# Patient Record
Sex: FEMALE | Race: WHITE | Employment: FULL TIME | URBAN - METROPOLITAN AREA
[De-identification: names, ages, dates, MRNs, and addresses within clinical notes are randomized per-mention and may not be internally consistent; named-entity substitution may affect disease eponyms.]

---

## 2017-12-12 PROBLEM — Z96.1 PSEUDOPHAKIA OF BOTH EYES: Status: ACTIVE | Noted: 2017-12-12

## 2017-12-12 PROBLEM — H35.89 RPE MOTTLING OF MACULA: Status: ACTIVE | Noted: 2017-12-12

## 2021-09-21 ENCOUNTER — HOSPITAL ENCOUNTER (INPATIENT)
Facility: HOSPITAL | Age: 76
LOS: 5 days | Discharge: HOME | DRG: 390 | End: 2021-09-26
Attending: EMERGENCY MEDICINE | Admitting: INTERNAL MEDICINE
Payer: COMMERCIAL

## 2021-09-21 ENCOUNTER — APPOINTMENT (OUTPATIENT)
Dept: GENERAL RADIOLOGY | Facility: HOSPITAL | Age: 76
DRG: 390 | End: 2021-09-21
Attending: EMERGENCY MEDICINE
Payer: COMMERCIAL

## 2021-09-21 DIAGNOSIS — K56.609 SMALL BOWEL OBSTRUCTION (HCC): Primary | ICD-10-CM

## 2021-09-21 PROBLEM — R73.9 HYPERGLYCEMIA: Status: ACTIVE | Noted: 2021-09-21

## 2021-09-21 PROBLEM — R79.89 AZOTEMIA: Status: ACTIVE | Noted: 2021-09-21

## 2021-09-21 PROCEDURE — S0028 INJECTION, FAMOTIDINE, 20 MG: HCPCS | Performed by: INTERNAL MEDICINE

## 2021-09-21 PROCEDURE — 71045 X-RAY EXAM CHEST 1 VIEW: CPT | Performed by: EMERGENCY MEDICINE

## 2021-09-21 PROCEDURE — 96365 THER/PROPH/DIAG IV INF INIT: CPT

## 2021-09-21 PROCEDURE — 99285 EMERGENCY DEPT VISIT HI MDM: CPT

## 2021-09-21 PROCEDURE — 96366 THER/PROPH/DIAG IV INF ADDON: CPT

## 2021-09-21 PROCEDURE — 0D9670Z DRAINAGE OF STOMACH WITH DRAINAGE DEVICE, VIA NATURAL OR ARTIFICIAL OPENING: ICD-10-PCS | Performed by: EMERGENCY MEDICINE

## 2021-09-21 PROCEDURE — 96375 TX/PRO/DX INJ NEW DRUG ADDON: CPT

## 2021-09-21 PROCEDURE — 96361 HYDRATE IV INFUSION ADD-ON: CPT

## 2021-09-21 RX ORDER — MORPHINE SULFATE 2 MG/ML
2 INJECTION, SOLUTION INTRAMUSCULAR; INTRAVENOUS EVERY 2 HOUR PRN
Status: DISCONTINUED | OUTPATIENT
Start: 2021-09-21 | End: 2021-09-26

## 2021-09-21 RX ORDER — METOCLOPRAMIDE HYDROCHLORIDE 5 MG/ML
5 INJECTION INTRAMUSCULAR; INTRAVENOUS EVERY 8 HOURS PRN
Status: DISCONTINUED | OUTPATIENT
Start: 2021-09-21 | End: 2021-09-26

## 2021-09-21 RX ORDER — MORPHINE SULFATE 2 MG/ML
1 INJECTION, SOLUTION INTRAMUSCULAR; INTRAVENOUS EVERY 2 HOUR PRN
Status: DISCONTINUED | OUTPATIENT
Start: 2021-09-21 | End: 2021-09-26

## 2021-09-21 RX ORDER — MORPHINE SULFATE 4 MG/ML
4 INJECTION, SOLUTION INTRAMUSCULAR; INTRAVENOUS EVERY 30 MIN PRN
Status: DISCONTINUED | OUTPATIENT
Start: 2021-09-21 | End: 2021-09-21

## 2021-09-21 RX ORDER — DEXTROSE, SODIUM CHLORIDE, AND POTASSIUM CHLORIDE 5; .45; .15 G/100ML; G/100ML; G/100ML
INJECTION INTRAVENOUS CONTINUOUS
Status: DISCONTINUED | OUTPATIENT
Start: 2021-09-21 | End: 2021-09-26

## 2021-09-21 RX ORDER — ONDANSETRON 2 MG/ML
4 INJECTION INTRAMUSCULAR; INTRAVENOUS EVERY 6 HOURS PRN
Status: DISCONTINUED | OUTPATIENT
Start: 2021-09-21 | End: 2021-09-26

## 2021-09-21 RX ORDER — ONDANSETRON 2 MG/ML
4 INJECTION INTRAMUSCULAR; INTRAVENOUS EVERY 4 HOURS PRN
Status: DISCONTINUED | OUTPATIENT
Start: 2021-09-21 | End: 2021-09-21

## 2021-09-21 RX ORDER — FAMOTIDINE 10 MG/ML
20 INJECTION, SOLUTION INTRAVENOUS NIGHTLY
Status: DISCONTINUED | OUTPATIENT
Start: 2021-09-21 | End: 2021-09-26

## 2021-09-21 RX ORDER — MORPHINE SULFATE 4 MG/ML
4 INJECTION, SOLUTION INTRAMUSCULAR; INTRAVENOUS EVERY 2 HOUR PRN
Status: DISCONTINUED | OUTPATIENT
Start: 2021-09-21 | End: 2021-09-26

## 2021-09-21 RX ORDER — MORPHINE SULFATE 4 MG/ML
4 INJECTION, SOLUTION INTRAMUSCULAR; INTRAVENOUS ONCE
Status: COMPLETED | OUTPATIENT
Start: 2021-09-21 | End: 2021-09-21

## 2021-09-21 RX ORDER — NAPROXEN SODIUM 220 MG
1-2 TABLET ORAL AS NEEDED
COMMUNITY
End: 2021-10-11 | Stop reason: ALTCHOICE

## 2021-09-21 RX ORDER — SODIUM CHLORIDE 9 MG/ML
INJECTION, SOLUTION INTRAVENOUS CONTINUOUS
Status: ACTIVE | OUTPATIENT
Start: 2021-09-21 | End: 2021-09-21

## 2021-09-21 NOTE — ED PROVIDER NOTES
Patient Seen in: [facility] Emergency Department      History   Patient presents with:  Abdomen/Flank Pain    Stated Complaint: Abdominal pain sent from IC     Subjective:   HPI    Previously healthy 74-year-old presents for evaluation of right lowe Pulse 92   Resp 18   Temp 98 °F (36.7 °C)   Temp src Temporal   SpO2 99 %   O2 Device None (Room air)       Current:/71   Pulse 80   Temp 98 °F (36.7 °C) (Temporal)   Resp 18   Ht 157.5 cm (5' 2\")   Wt 50.8 kg   SpO2 99%   BMI 20.49 kg/m² Ratio   10.0 - 20.0 34.0 High     Sodium   136 - 145 mmol/L 136    Potassium   3.50 - 5.10 mmol/L 4.08    Chloride   98 - 107 mmol/L 100    Carbon Dioxide   22.0 - 29.0 mmol/L 26.8    Calcium   8.3 - 10.3 mg/dL 10.1    Comment: Corrected Calcium Formula: ( symmetric nephrograms without hydronephrosis. Bilateral renal cysts   are noted measuring up to 2.5 cm. PERITONEUM/MESENTERY/OMENTUM: Gerber McCaulley is a small volume of ascites within the mesentery and pelvis   with partial loculation.      GI TRACT:  Small hi patient  Admission disposition: 9/21/2021  6:01 PM                                  Disposition and Plan     Clinical Impression:  Small bowel obstruction ([address]  (primary encounter diagnosis)     Disposition:  Admit  9/21/2021  6:01 pm    Follow-up:  No fo

## 2021-09-22 ENCOUNTER — APPOINTMENT (OUTPATIENT)
Dept: GENERAL RADIOLOGY | Facility: HOSPITAL | Age: 76
DRG: 390 | End: 2021-09-22
Attending: INTERNAL MEDICINE
Payer: COMMERCIAL

## 2021-09-22 PROCEDURE — S0028 INJECTION, FAMOTIDINE, 20 MG: HCPCS | Performed by: INTERNAL MEDICINE

## 2021-09-22 PROCEDURE — 83735 ASSAY OF MAGNESIUM: CPT | Performed by: INTERNAL MEDICINE

## 2021-09-22 PROCEDURE — 80048 BASIC METABOLIC PNL TOTAL CA: CPT | Performed by: INTERNAL MEDICINE

## 2021-09-22 PROCEDURE — 85610 PROTHROMBIN TIME: CPT | Performed by: INTERNAL MEDICINE

## 2021-09-22 PROCEDURE — 85025 COMPLETE CBC W/AUTO DIFF WBC: CPT | Performed by: INTERNAL MEDICINE

## 2021-09-22 PROCEDURE — 71045 X-RAY EXAM CHEST 1 VIEW: CPT | Performed by: INTERNAL MEDICINE

## 2021-09-22 RX ORDER — POTASSIUM CHLORIDE 14.9 MG/ML
20 INJECTION INTRAVENOUS ONCE
Status: COMPLETED | OUTPATIENT
Start: 2021-09-22 | End: 2021-09-22

## 2021-09-22 NOTE — PLAN OF CARE
Assumed car  Problem: GASTROINTESTINAL - ADULT  Goal: Minimal or absence of nausea and vomiting  Description: INTERVENTIONS:  - Maintain adequate hydration with IV or PO as ordered and tolerated  - Nasogastric tube to low intermittent suction as ordered  -

## 2021-09-22 NOTE — PLAN OF CARE
Problem: GASTROINTESTINAL - ADULT  Goal: Minimal or absence of nausea and vomiting  Description: INTERVENTIONS:  - Maintain adequate hydration with IV or PO as ordered and tolerated  - Nasogastric tube to low intermittent suction as ordered  - Evaluate e LIGHT. PLAN OF CARE EXPLAINED. WILL CONTINUE TO MONITOR.

## 2021-09-22 NOTE — PROGRESS NOTES
[facility] Hospitalist Progress Note     PCP: Larissa Ellison MD    Chief Complaint: follow-up    Overnight/Interim Events:      SUBJECTIVE:  Feeling well, \"great\", laying in bed, better. No n/v. 119/81, 78. Some flatus.      OBJECTIVE:  Temp:  [98 °F (36.7 °C)- last 168 hours. No results for input(s): TROP in the last 168 hours.       Meds:     • famoTIDine  20 mg Intravenous Nightly   • piperacillin-tazobactam  3.375 g Intravenous Q8H     • KCl in Dextrose-NaCl 75 mL/hr at 09/22/21 0541     morphINE sulfate **

## 2021-09-22 NOTE — ED QUICK NOTES
Orders for admission, patient is aware of plan and ready to go upstairs. Any questions, please call ED SHANTI hernandez  at extension [phone]. Vaccinated?  One dose  Type of COVID test sent: rapid  COVID Suspicion level: Low      Titratable drug(s) infusing: n/a

## 2021-09-22 NOTE — H&P
General Medicine H&P     Patient presents with:  Abdomen/Flank Pain       PCP: Sera Zhao MD    History of Present Illness: Patient is a 67year old female with PMH including but not limited to  who p/t [facility] ED c abd pain.      Pt reports having s Lungs:   Clear to auscultation bilaterally.  Normal effort   Chest wall:  No tenderness or deformity   Heart:  Regular rate and rhythm, S1, S2 normal, no murmur, rub or gallop appreciated   Abdomen:   Tight, NT/ND, dec bowel sounds normal. No masses,  No small bowel measuring up to 3.5 cm with transition within the right lower quadrant concerning for high-grade small bowel obstruction. There is mild thickening of the appendix with mural enhancement measuring up to 9 mm.  UROGENITAL STRUCTURES: The uterus is reviewed c  high-grade small bowel obstruction with transition within the right lower quadrant; mild appendiceal thickening with mural enhancement may relate to reactive inflammation  - NPO, IVF  - NGT  - surgery c/s  - serial exams, follow temps   - if wo

## 2021-09-22 NOTE — CONSULTS
[facility]  Report of Consultation    Eryn Brewer Patient Status:  Inpatient    1945 MRN [id number]   [facility] 2NE-A Attending Jayla Frazier MD   [facility] Day # 1 PCP Liz Irizarry MD     21    Reason for Consultat drugs.     Allergies:    No Known Allergies    Current Medications:      Current Facility-Administered Medications:   •  potassium chloride IVPB premix 20 mEq, 20 mEq, Intravenous, Once  •  dextrose 5 % and 0.45 % NaCl with KCl 20 mEq infusion, , Intravenou Recent Labs   Lab 09/21/21  1143 09/22/21  0644    140   K 4.08 3.7    109   CO2 26.8 25.0   BUN 37.0* 26*   CREATSERUM 1.09* 1.00   * 96   CA  --  8.6   MG  --  2.1       Recent Labs   Lab 09/21/21  1143   ALT 9   AST 12   ALB 3.7 measuring up to 9 mm. UROGENITAL STRUCTURES: The uterus is present. The bladder is mildly distended without focal wall thickening. VASCULATURE: Moderate calcific aortoiliac atherosclerosis is noted.  There is mild ectasia of the infrarenal abdominal aorta m correct tube placement  PATIENT STATED HISTORY: (As transcribed by Technologist)    Verify correct tube placement . FINDINGS:  There is an enteric tube which overlies the expected region of the stomach. The chest appears clear.   No focal consolidation

## 2021-09-22 NOTE — PLAN OF CARE
Pt received at 0730. Patient is alert and oriented x4. Patient is on 2L nasal cannula. Her breath sounds are clear bilaterally. She is normal sinus rhythm on tele. Abdomen is soft and round, baseline per pt. She denies any tenderness.  NG tube is to low int mobilization and activity  - Obtain nutritional consult as needed  - Establish a toileting routine/schedule  - Consider collaborating with pharmacy to review patient's medication profile  9/22/2021 1134 by Jayant Flroes RN  Outcome: Progressing  9/22/2021 1

## 2021-09-22 NOTE — PAYOR COMM NOTE
--------------  ADMISSION REVIEW     Payor: [address] [place] [address] Road #:  [id number]  Authorization Number: [id number]    ED Provider Notes        History   Patient presents with:  Abdomen/Flank Pain    Stated Complaint: Abdominal overlies the expected region of the stomach  Impression   CONCLUSION:  Enteric tube overlies the expected region of the stomach.          Labs from urgent care:  WBC   4.00 - 13.00 10^3/uL 16.59 High     RBC   3.80 - 5.10 10^6/uL 4.42    Hemoglobin   12.0 - Bilirubin Urine   Negative Moderate Abnormal     Ketones, UA   Negative - Trace mg/dL Trace    Spec Gravity   1.005 - 1.030 >=1.030    Blood Urine   Negative Moderate Abnormal     PH Urine   5.0 - 8.0 5.5    Protein Urine   Negative - Trace mg/dL 100  Ab pelvic adenopathy. BODY WALL/OSSEOUS STRUCTURES:  Multilevel degenerative disc disease is most pronounced at the   lumbosacral junction. No aggressive osseous lesion is identified.      Impression   IMPRESSION:     1.  High-grade small bowel obstruction Alert, no distress, appears stated age. Head:  Normocephalic, without obvious abnormality, atraumatic. Eyes:  Sclera anicteric, EOMs intact.     Nose: Nares normal,  Mucosa normal    Throat: Lips normal   Neck: Supple, symmetrical, trachea midline   Sarina hydronephrosis. Bilateral renal cysts are noted measuring up to 2.5 cm. PERITONEUM/MESENTERY/OMENTUM:  There is a small volume of ascites within the mesentery and pelvis with partial loculation. GI TRACT:  Small hiatal hernia is noted.  There are dilated lo (CST): Isaiah Quick MD on 2021 at 6:51 PM     Finalized by (CST): Isaiah Quick MD on 2021 at 6:52 PM            ASSESSMENT / PLAN:    67year old female with PMH including but not limited to  who p/t [facility] ED c abd pain.      # abd pain 2/2 SBO encouraged ambulation/IS  Continue IV fluids  Serial abdominal exams  Obstructive series tomorrow            Recent Labs   Lab 09/21/21  1143 09/22/21  0644   WBC 16.59* 10.9   HGB 12.9 11.2*   MCV 88.9 88.5    267.0   INR  --  1.14*              Re Piperacillin Sod-Tazobactam So (ZOSYN) 3.375 g in dextrose 5% 100 mL IVPB-ADDV     Date Action Dose Route User    9/22/2021 0122 New Bag  Intravenous Maddy Santos RN      potassium chloride IVPB premix 20 mEq     Date Action Dose Route User    9/22/

## 2021-09-23 ENCOUNTER — APPOINTMENT (OUTPATIENT)
Dept: GENERAL RADIOLOGY | Facility: HOSPITAL | Age: 76
DRG: 390 | End: 2021-09-23
Attending: PHYSICIAN ASSISTANT
Payer: COMMERCIAL

## 2021-09-23 PROCEDURE — 74019 RADEX ABDOMEN 2 VIEWS: CPT | Performed by: PHYSICIAN ASSISTANT

## 2021-09-23 PROCEDURE — S0028 INJECTION, FAMOTIDINE, 20 MG: HCPCS | Performed by: INTERNAL MEDICINE

## 2021-09-23 PROCEDURE — 83735 ASSAY OF MAGNESIUM: CPT | Performed by: INTERNAL MEDICINE

## 2021-09-23 PROCEDURE — 80053 COMPREHEN METABOLIC PANEL: CPT | Performed by: INTERNAL MEDICINE

## 2021-09-23 PROCEDURE — 85027 COMPLETE CBC AUTOMATED: CPT | Performed by: INTERNAL MEDICINE

## 2021-09-23 NOTE — PLAN OF CARE
Problem: Patient/Family Goals  Goal: Patient/Family Long Term Goal  Description: Patient's Long Term Goal: Discharge home    Interventions:  - Monitor for further pain  -Manage NG tube  - See additional Care Plan goals for specific interventions  Outcome nutritional intake (bariatric)  Description: INTERVENTIONS:  - Monitor for over-consumption  - Identify factors contributing to increased intake, treat as appropriate  - Monitor I&O, WT and lab values  - Obtain nutritional consult as needed  - Evaluate psy

## 2021-09-23 NOTE — PLAN OF CARE
Received pt at 1930. A&Ox4, NSR on tele. On RA. No complaints of pain tonight. NG tube to LIS. IVF infusing. Up with SBA. Abdomen slightly distended, bowel sounds active and pt passing gas.  Obstructive series in am. Pt updated on poc and resting comfortabl intake, treat as appropriate  - Assist with meals as needed  - Monitor I&O, WT and lab values  - Obtain nutritional consult as needed  - Optimize oral hygiene and moisture  - Encourage food from home; allow for food preferences  - Enhance eating environmen

## 2021-09-23 NOTE — PROGRESS NOTES
[facility]  Progress Note    Erie Kanner Patient Status:  Inpatient    1945 MRN [id number]   [facility] 2NE-A Attending Olivier Neves MD   Hosp Day # 2 PCP Terri Bolden MD     Subjective:    Patient denies any new complain Surgery  9/23/2021        Attending:    I have reviewed the above listed note and evaluation by the physician assistant. I have personally seen and examined the patient and reviewed all relevant labs and reports.  I agree with her physical exam and the

## 2021-09-23 NOTE — PROGRESS NOTES
[facility] Hospitalist Progress Note     PCP: Yoli Phillips MD    Chief Complaint: follow-up    Overnight/Interim Events:      SUBJECTIVE:  Pain increased overnight. Controlled with IV morphine. Currently pain 3/10. Feeling comfortable at this time. No nausea. phenol       Assessment/Plan:     67year old female with PMH including but not limited to  who p/t [facility] ED c abd pain.      # Abd pain 2/2 SBO  - CT reviewed c  high-grade small bowel obstruction with transition within the right lower quadrant; mil

## 2021-09-24 ENCOUNTER — APPOINTMENT (OUTPATIENT)
Dept: GENERAL RADIOLOGY | Facility: HOSPITAL | Age: 76
DRG: 390 | End: 2021-09-24
Attending: PHYSICIAN ASSISTANT
Payer: COMMERCIAL

## 2021-09-24 PROCEDURE — 80048 BASIC METABOLIC PNL TOTAL CA: CPT | Performed by: HOSPITALIST

## 2021-09-24 PROCEDURE — S0028 INJECTION, FAMOTIDINE, 20 MG: HCPCS | Performed by: INTERNAL MEDICINE

## 2021-09-24 PROCEDURE — 74019 RADEX ABDOMEN 2 VIEWS: CPT | Performed by: PHYSICIAN ASSISTANT

## 2021-09-24 NOTE — PROGRESS NOTES
[facility]  Progress Note    Jeromy Hawkins Patient Status:  Inpatient    1945 MRN [id number]   [facility] 2NE-A Attending Tea Shultz MD   Hosp Day # 3 PCP Luís Mccormack MD     Subjective:    Patient reports cramping last n

## 2021-09-24 NOTE — PLAN OF CARE
RN SHIFT NOTE    Assumed care of pt at 0700. Pt denies pain at this time. Pt is alert and oriented x 4. Pt is NSR on tele, S1 and S2 present and pt denies cardiac symptoms. Lung sounds diminished/ clear bilaterally. Abdomen soft and round.  Bowel sounds hyp needed  - Optimize oral hygiene and moisture  - Encourage food from home; allow for food preferences  - Enhance eating environment  Outcome: Progressing  Goal: Achieves appropriate nutritional intake (bariatric)  Description: INTERVENTIONS:  - Monitor for

## 2021-09-24 NOTE — PLAN OF CARE
Alert and oriented x4 on tele monitor hr 70's sinus rhythm. Ngt to lis in placed. Abdomen soft and hypo active bowel sound. Denies any pain and no nausea and vomiting. Ivf of d5.45ns+20 kcl at 75 ml/hr in progress patent and intact.  All needs attended and treat as appropriate  - Assist with meals as needed  - Monitor I&O, WT and lab values  - Obtain nutritional consult as needed  - Optimize oral hygiene and moisture  - Encourage food from home; allow for food preferences  - Enhance eating environment  Outco

## 2021-09-24 NOTE — PROGRESS NOTES
[facility] Hospitalist Progress Note     PCP: Aniceto Samano MD    Chief Complaint: follow-up    Overnight/Interim Events:      SUBJECTIVE:  Pt in good spirits. +flatus.  Pain improving    OBJECTIVE:  Temp:  [97.9 °F (36.6 °C)-98.8 °F (37.1 °C)] 97.9 °F (36.6 °C) relate to reactive inflammation  - NPO x ice chips, IVF  - NGT to LIS  - repeat obs series this AM reviewed - SBO again noted  - apprec gen surg recs  - trend CMP, CBC     # Leukocytosis  -reactive from above, 16.5 to 10.9 --> 6     # abnormal UA  - UCx wi

## 2021-09-25 PROCEDURE — 85025 COMPLETE CBC W/AUTO DIFF WBC: CPT | Performed by: HOSPITALIST

## 2021-09-25 PROCEDURE — S0028 INJECTION, FAMOTIDINE, 20 MG: HCPCS | Performed by: INTERNAL MEDICINE

## 2021-09-25 PROCEDURE — 80048 BASIC METABOLIC PNL TOTAL CA: CPT | Performed by: HOSPITALIST

## 2021-09-25 NOTE — PLAN OF CARE
Alert and oriented x4 on tele monitor hr 70's sinus rhythm. Ng tube to lis draining scant amount of greenish fluid. Denies any pain at this time. Updated w/ poc and verbalized understanding. All needs attended and will continue to monitor.  Call light withi needed  - Monitor I&O, WT and lab values  - Obtain nutritional consult as needed  - Optimize oral hygiene and moisture  - Encourage food from home; allow for food preferences  - Enhance eating environment  Outcome: Progressing  Goal: Achieves appropriate n

## 2021-09-25 NOTE — PROGRESS NOTES
[facility]  Progress Note    Maico Stoavll Patient Status:  Inpatient    1945 MRN [id number]   [facility] 2NE-A Attending Pauline Baltazar MD   Hosp Day # 4 PCP Dmitry Luna MD     Subjective:    Feels better today.   Teagan Alvarado

## 2021-09-25 NOTE — PROGRESS NOTES
[facility] Hospitalist Progress Note     PCP: Sam De Guzman MD    Chief Complaint: follow-up    Overnight/Interim Events:      SUBJECTIVE:  Pt laying in bed, feels \"great\".  No n/v. NGT clamped   OBJECTIVE:  Temp:  [97.8 °F (36.6 °C)-99.1 °F (37.3 °C)] 97.8 °F appendiceal thickening with mural enhancement may relate to reactive inflammation  - NPO x ice chips, IVF  - NGT clamped, clears if removed   - repeat obs series reviewed - SBO again noted  - apprec gen surg recs  - trend CMP, CBC     # Leukocytosis  -reac

## 2021-09-26 VITALS
SYSTOLIC BLOOD PRESSURE: 145 MMHG | RESPIRATION RATE: 18 BRPM | TEMPERATURE: 98 F | BODY MASS INDEX: 21.19 KG/M2 | OXYGEN SATURATION: 95 % | DIASTOLIC BLOOD PRESSURE: 65 MMHG | WEIGHT: 115.13 LBS | HEART RATE: 70 BPM | HEIGHT: 62 IN

## 2021-09-26 PROCEDURE — 80048 BASIC METABOLIC PNL TOTAL CA: CPT | Performed by: HOSPITALIST

## 2021-09-26 NOTE — PROGRESS NOTES
Rome Luong was under my care at [facility] from 9/21/2021 through 9/26/2021.  Please excuse her from work through 9/29/21    MD Anthony Mendez  [phone]  9/26/2021  12:49 PM

## 2021-09-26 NOTE — PLAN OF CARE
Assumed care at 0730. Pt is A&Ox4. Pt is on RA with , sats maintaining >90%, lungs clear. NSR on tele, VSS. No complaints of cardiac symptoms. Continent of B&B. SBO NG tube in place to LIS. Denies pain at this time. Up independently, tolerating well.  Pl Progressing  Goal: Maintains adequate nutritional intake (undernourished)  Description: INTERVENTIONS:  - Monitor percentage of each meal consumed  - Identify factors contributing to decreased intake, treat as appropriate  - Assist with meals as needed  -

## 2021-09-26 NOTE — PLAN OF CARE
Assumed care at 0730. Pt is A&Ox4. Pt is on RA with , sats maintaining >90%, lungs clear. NSR on tele, VSS. No complaints of cardiac symptoms. Continent of B&B. No complaints of GI symptoms. SBO NG tube DC yesterday. Denies pain at this time.  Up indepen Encourage mobilization and activity  - Obtain nutritional consult as needed  - Establish a toileting routine/schedule  - Consider collaborating with pharmacy to review patient's medication profile  9/26/2021 1007 by Lana Daily RN  Outcome: Geovanna Najera

## 2021-09-26 NOTE — DISCHARGE SUMMARY
[facility] Internal Medicine Discharge Summary   Patient ID:  Lyndsey Aguilar  [id number]  3 year old  1/26/1945    Admit date: 9/21/2021    Discharge date and time: 9/26/2021     Attending Physician: Domonique Benito MD     Primary Care Physician: Adis Welsh alert and oriented  CV: RRR, +s1/s2  Lungs: CTAB, good respiratory effort  Abdomen: s/nt/nd  Ext: Moves all 4 extremities, no c/c/e  Neuro: CN Intact, no focal deficits      Discharge meds     Medication List      CONTINUE taking these medications    Yvonne appendix with mural enhancement measuring up to 9 mm. UROGENITAL STRUCTURES: The uterus is present. The bladder is mildly distended without focal wall thickening. VASCULATURE: Moderate calcific aortoiliac atherosclerosis is noted.  There is mild ectasia of COMPARISON:  None. INDICATIONS:  Verify correct tube placement  PATIENT STATED HISTORY: (As transcribed by Technologist)    Verify correct tube placement . FINDINGS:  There is an enteric tube which overlies the expected region of the stomach.   The ches obstructive series of the abdomen and pelvis were obtained. COMPARISON:  None. INDICATIONS:  SBO  PATIENT STATED HISTORY: (As transcribed by Technologist)  Patient offered no additional history at this time.     FINDINGS:  BOWEL GAS PATTERN:  Few distende

## 2021-09-26 NOTE — PROGRESS NOTES
Explained discharge instructions including mediations and follow ups to the patient, verbalizes understanding. IV removed, catheter intact. Tele monitor discontinued.

## 2021-09-26 NOTE — PROGRESS NOTES
[facility]  Progress Note    Alcon Lipps Patient Status:  Inpatient    1945 MRN [id number]   [facility] 2NE-A Attending John Rolon MD   Hosp Day # 5 PCP Aniceto Samano MD     Subjective:    Feels better.   Denies hema

## 2021-09-26 NOTE — PLAN OF CARE
Alert and oriented x4 on tele monitor hr 70's sinus rhythm. Ivf of d5.45nstl + 20 kcl at 75 ml/hr in progress patent and intact. Denies any pain. Updated w/ poc and verbalized understanding. All needs attended and will continue to monitor.  Call light withi needed  - Monitor I&O, WT and lab values  - Obtain nutritional consult as needed  - Optimize oral hygiene and moisture  - Encourage food from home; allow for food preferences  - Enhance eating environment  Outcome: Progressing  Goal: Achieves appropriate n

## 2021-09-27 NOTE — PAYOR COMM NOTE
Discharge Notification    Patient Name: Aj Marquez: [id number] [facility] [address] [address] #: [id number]  Authorization Number: [id number]  Admit Date/Time: 9/21/2021 5:39 PM  Discharge Date/Time: 9/26/2021 2:31 PM